# Patient Record
Sex: MALE | Race: WHITE | Employment: UNEMPLOYED | URBAN - METROPOLITAN AREA
[De-identification: names, ages, dates, MRNs, and addresses within clinical notes are randomized per-mention and may not be internally consistent; named-entity substitution may affect disease eponyms.]

---

## 2017-01-04 ENCOUNTER — OFFICE VISIT (OUTPATIENT)
Dept: PAIN CLINIC | Facility: HOSPITAL | Age: 47
End: 2017-01-04
Attending: ANESTHESIOLOGY

## 2017-01-04 VITALS
SYSTOLIC BLOOD PRESSURE: 116 MMHG | HEIGHT: 69 IN | WEIGHT: 150 LBS | BODY MASS INDEX: 22.22 KG/M2 | HEART RATE: 58 BPM | DIASTOLIC BLOOD PRESSURE: 72 MMHG

## 2017-01-04 DIAGNOSIS — K62.89 RECTAL PAIN: Primary | ICD-10-CM

## 2017-01-04 PROCEDURE — 99211 OFF/OP EST MAY X REQ PHY/QHP: CPT
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
## 2017-01-10 ENCOUNTER — TELEPHONE (OUTPATIENT)
Dept: PAIN CLINIC | Facility: HOSPITAL | Age: 47
End: 2017-01-10

## 2017-02-06 ENCOUNTER — OFFICE VISIT (OUTPATIENT)
Dept: PAIN CLINIC | Facility: HOSPITAL | Age: 47
End: 2017-02-06
Attending: ANESTHESIOLOGY
Payer: COMMERCIAL

## 2017-02-06 VITALS
BODY MASS INDEX: 22.96 KG/M2 | DIASTOLIC BLOOD PRESSURE: 70 MMHG | HEIGHT: 69 IN | HEART RATE: 58 BPM | WEIGHT: 155 LBS | SYSTOLIC BLOOD PRESSURE: 122 MMHG

## 2017-02-06 DIAGNOSIS — M53.3 COCCYDYNIA: Primary | ICD-10-CM

## 2017-02-06 PROCEDURE — 99211 OFF/OP EST MAY X REQ PHY/QHP: CPT
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
## 2017-02-14 ENCOUNTER — TELEPHONE (OUTPATIENT)
Dept: NEUROLOGY | Facility: CLINIC | Age: 47
End: 2017-02-14

## 2017-02-14 NOTE — TELEPHONE ENCOUNTER
Medication request: Celebrex 200mg     LOV 4/22/16  No follow up    Last refill: 10/12/16 #60    Patient has appt with pain management (Dr. Louie Pisano on 2/24/17) and has been seeing pain management for care    Reviewed with Dr. Derek Villegas who states refill reques

## 2017-02-22 RX ORDER — CELECOXIB 200 MG/1
CAPSULE ORAL
Qty: 60 CAPSULE | Refills: 0 | OUTPATIENT
Start: 2017-02-22

## 2017-03-20 ENCOUNTER — TELEPHONE (OUTPATIENT)
Dept: PAIN CLINIC | Facility: HOSPITAL | Age: 47
End: 2017-03-20

## 2017-03-20 ENCOUNTER — HOSPITAL ENCOUNTER (OUTPATIENT)
Dept: GENERAL RADIOLOGY | Facility: HOSPITAL | Age: 47
Discharge: HOME | End: 2017-03-20
Attending: ANESTHESIOLOGY
Payer: COMMERCIAL

## 2017-03-20 DIAGNOSIS — M53.3 COCCYDYNIA: ICD-10-CM

## 2017-03-20 PROCEDURE — 72220 X-RAY EXAM SACRUM TAILBONE: CPT

## 2017-03-20 NOTE — TELEPHONE ENCOUNTER
Patient walked in to make an appointment with Dr Naz Gomez only in March or April. I offered to schedule with a different physician. Patient declined and walked away. Patient walked away swearing.

## 2019-04-11 ENCOUNTER — OFFICE VISIT (OUTPATIENT)
Dept: INTERNAL MEDICINE CLINIC | Facility: CLINIC | Age: 49
End: 2019-04-11
Payer: COMMERCIAL

## 2019-04-11 VITALS
HEIGHT: 69 IN | HEART RATE: 47 BPM | RESPIRATION RATE: 16 BRPM | BODY MASS INDEX: 26.07 KG/M2 | DIASTOLIC BLOOD PRESSURE: 94 MMHG | SYSTOLIC BLOOD PRESSURE: 150 MMHG | WEIGHT: 176 LBS

## 2019-04-11 DIAGNOSIS — M25.50 ARTHRALGIA, UNSPECIFIED JOINT: ICD-10-CM

## 2019-04-11 DIAGNOSIS — R25.2 MUSCLE CRAMPS: ICD-10-CM

## 2019-04-11 DIAGNOSIS — R10.84 GENERALIZED ABDOMINAL PAIN: Primary | ICD-10-CM

## 2019-04-11 PROCEDURE — 99205 OFFICE O/P NEW HI 60 MIN: CPT | Performed by: INTERNAL MEDICINE

## 2019-04-11 PROCEDURE — 99212 OFFICE O/P EST SF 10 MIN: CPT | Performed by: INTERNAL MEDICINE

## 2019-04-11 RX ORDER — ACETAMINOPHEN 325 MG/1
325 TABLET ORAL EVERY 6 HOURS PRN
COMMUNITY
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
## 2019-04-11 NOTE — PATIENT INSTRUCTIONS
Problem List Items Addressed This Visit        Unprioritized    Generalized abdominal pain - Primary     6-year history of abdominal bloating, crampy pain, diarrhea with sense of incomplete evacuation and incontinence.   He has lost a significant amount of

## 2019-04-11 NOTE — ASSESSMENT & PLAN NOTE
6-year history of abdominal bloating, crampy pain, diarrhea with sense of incomplete evacuation and incontinence. He has lost a significant amount of weight but has managed to gain some by force-feeding himself with smoothies at this time.   No fevers or c

## 2019-04-24 ENCOUNTER — APPOINTMENT (OUTPATIENT)
Dept: LAB | Facility: HOSPITAL | Age: 49
End: 2019-04-24
Attending: INTERNAL MEDICINE
Payer: COMMERCIAL

## 2019-04-24 PROCEDURE — 36415 COLL VENOUS BLD VENIPUNCTURE: CPT | Performed by: INTERNAL MEDICINE

## 2019-04-24 PROCEDURE — 86039 ANTINUCLEAR ANTIBODIES (ANA): CPT | Performed by: INTERNAL MEDICINE

## 2019-04-24 PROCEDURE — 81003 URINALYSIS AUTO W/O SCOPE: CPT | Performed by: INTERNAL MEDICINE

## 2019-04-24 PROCEDURE — 85025 COMPLETE CBC W/AUTO DIFF WBC: CPT | Performed by: INTERNAL MEDICINE

## 2019-04-24 PROCEDURE — 85652 RBC SED RATE AUTOMATED: CPT | Performed by: INTERNAL MEDICINE

## 2019-04-24 PROCEDURE — 83516 IMMUNOASSAY NONANTIBODY: CPT | Performed by: INTERNAL MEDICINE

## 2019-04-24 PROCEDURE — 86003 ALLG SPEC IGE CRUDE XTRC EA: CPT | Performed by: INTERNAL MEDICINE

## 2019-04-24 PROCEDURE — 84443 ASSAY THYROID STIM HORMONE: CPT | Performed by: INTERNAL MEDICINE

## 2019-04-24 PROCEDURE — 86235 NUCLEAR ANTIGEN ANTIBODY: CPT | Performed by: INTERNAL MEDICINE

## 2019-04-24 PROCEDURE — 82306 VITAMIN D 25 HYDROXY: CPT | Performed by: INTERNAL MEDICINE

## 2019-04-24 PROCEDURE — 82607 VITAMIN B-12: CPT | Performed by: INTERNAL MEDICINE

## 2019-04-24 PROCEDURE — 82150 ASSAY OF AMYLASE: CPT | Performed by: INTERNAL MEDICINE

## 2019-04-24 PROCEDURE — 83735 ASSAY OF MAGNESIUM: CPT | Performed by: INTERNAL MEDICINE

## 2019-04-24 PROCEDURE — 86038 ANTINUCLEAR ANTIBODIES: CPT | Performed by: INTERNAL MEDICINE

## 2019-04-24 PROCEDURE — 84550 ASSAY OF BLOOD/URIC ACID: CPT | Performed by: INTERNAL MEDICINE

## 2019-04-24 PROCEDURE — 82785 ASSAY OF IGE: CPT | Performed by: INTERNAL MEDICINE

## 2019-04-24 PROCEDURE — 86225 DNA ANTIBODY NATIVE: CPT | Performed by: INTERNAL MEDICINE

## 2019-04-24 PROCEDURE — 86431 RHEUMATOID FACTOR QUANT: CPT | Performed by: INTERNAL MEDICINE

## 2019-04-24 PROCEDURE — 83690 ASSAY OF LIPASE: CPT | Performed by: INTERNAL MEDICINE

## 2019-04-24 PROCEDURE — 80053 COMPREHEN METABOLIC PANEL: CPT | Performed by: INTERNAL MEDICINE

## 2019-04-25 ENCOUNTER — HOSPITAL ENCOUNTER (OUTPATIENT)
Dept: CT IMAGING | Facility: HOSPITAL | Age: 49
Discharge: HOME | End: 2019-04-25
Attending: INTERNAL MEDICINE
Payer: COMMERCIAL

## 2019-04-25 DIAGNOSIS — R10.84 GENERALIZED ABDOMINAL PAIN: ICD-10-CM

## 2019-04-25 PROCEDURE — 74177 CT ABD & PELVIS W/CONTRAST: CPT | Performed by: INTERNAL MEDICINE

## 2019-05-03 ENCOUNTER — OFFICE VISIT (OUTPATIENT)
Dept: INTERNAL MEDICINE CLINIC | Facility: CLINIC | Age: 49
End: 2019-05-03
Payer: COMMERCIAL

## 2019-05-03 VITALS
SYSTOLIC BLOOD PRESSURE: 122 MMHG | HEIGHT: 69 IN | WEIGHT: 178 LBS | DIASTOLIC BLOOD PRESSURE: 77 MMHG | BODY MASS INDEX: 26.36 KG/M2 | HEART RATE: 52 BPM

## 2019-05-03 DIAGNOSIS — R25.2 MUSCLE CRAMPS: ICD-10-CM

## 2019-05-03 DIAGNOSIS — R10.84 GENERALIZED ABDOMINAL PAIN: ICD-10-CM

## 2019-05-03 DIAGNOSIS — K58.0 IRRITABLE BOWEL SYNDROME WITH DIARRHEA: Primary | ICD-10-CM

## 2019-05-03 PROCEDURE — 99212 OFFICE O/P EST SF 10 MIN: CPT | Performed by: INTERNAL MEDICINE

## 2019-05-03 PROCEDURE — 99214 OFFICE O/P EST MOD 30 MIN: CPT | Performed by: INTERNAL MEDICINE

## 2019-05-03 RX ORDER — TIZANIDINE 2 MG/1
2 TABLET ORAL NIGHTLY
Qty: 30 TABLET | Refills: 2 | Status: SHIPPED | OUTPATIENT
Start: 2019-05-03 | End: 2019-07-30

## 2019-05-03 RX ORDER — DICYCLOMINE HYDROCHLORIDE 10 MG/1
CAPSULE ORAL
Qty: 30 CAPSULE | Refills: 0 | Status: SHIPPED | OUTPATIENT
Start: 2019-05-03 | End: 2019-06-03

## 2019-05-03 NOTE — PROGRESS NOTES
HPI:    Patient ID: Prudencio Dougherty is a 52year old male. CT abdomen as follows. FINDINGS:  LUNG BASES:  The heart is normal in size. There is dependent subsegmental atelectasis bilaterally.     LIVER:  Isolated low density adjacent to the falciform l started more than 1 year ago. The onset quality is gradual. The problem occurs constantly. The problem has been unchanged. The pain is located in the generalized abdominal region. The pain is at a severity of 9/10. The pain is severe.  The quality of the pa Carbonate Antacid (TUMS E-X 750 OR) Take by mouth. Disp:  Rfl:    Pantoprazole Sodium (PROTONIX) 40 MG Oral Tab EC  Disp:  Rfl: 6   Multiple Vitamin (ONE-DAILY MULTI VITAMINS) Oral Tab Take 1 tablet by mouth daily.  Disp:  Rfl:      Allergies:  Shrimp Medications    Dicyclomine HCl 10 MG Oral Cap    Other Relevant Orders    PELVIC FLOOR THERAPY - INTERNAL    Generalized abdominal pain     6-year history of abdominal pain–crampy associated with recurrent bowel movements usually in the morning which seems 5/31/2019), or if symptoms worsen or fail to improve. PT UNDERSTANDS AND AGREES TO FOLLOW DIRECTIONS AND ADVICE    No orders of the defined types were placed in this encounter.       Meds This Visit:  Requested Prescriptions     Signed Prescriptions Disp

## 2019-05-03 NOTE — PATIENT INSTRUCTIONS
Problem List Items Addressed This Visit        Unprioritized    Generalized abdominal pain    Relevant Medications    Dicyclomine HCl 10 MG Oral Cap    Other Relevant Orders    PELVIC FLOOR THERAPY - INTERNAL    IBS (irritable bowel syndrome) - Primary

## 2019-05-13 NOTE — ASSESSMENT & PLAN NOTE
6-year history of abdominal pain–crampy associated with recurrent bowel movements usually in the morning which seems almost like incomplete evacuation. He has about 6-10 formed bowel movements associated with significant cramps and pains.   He did lose a s
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
## 2019-06-03 DIAGNOSIS — R10.84 GENERALIZED ABDOMINAL PAIN: ICD-10-CM

## 2019-06-03 DIAGNOSIS — K58.0 IRRITABLE BOWEL SYNDROME WITH DIARRHEA: ICD-10-CM

## 2019-06-03 RX ORDER — DICYCLOMINE HYDROCHLORIDE 10 MG/1
CAPSULE ORAL
Qty: 30 CAPSULE | Refills: 0 | Status: SHIPPED | OUTPATIENT
Start: 2019-06-03 | End: 2019-08-20 | Stop reason: ALTCHOICE

## 2019-06-03 NOTE — TELEPHONE ENCOUNTER
Current Outpatient Medications:  Dicyclomine HCl 10 MG Oral Cap 1 capsule once  Day in am Disp: 30 capsule Rfl: 0

## 2019-07-29 DIAGNOSIS — R25.2 MUSCLE CRAMPS: ICD-10-CM

## 2019-07-30 RX ORDER — TIZANIDINE 2 MG/1
2 TABLET ORAL NIGHTLY
Qty: 30 TABLET | Refills: 2 | Status: SHIPPED | OUTPATIENT
Start: 2019-07-30 | End: 2022-01-20 | Stop reason: ALTCHOICE

## 2019-07-30 NOTE — TELEPHONE ENCOUNTER
Review pended refill request as it does not fall under a protocol.     Last Rx: 5/3/19  LOV: 5/3/19    Requested Prescriptions   Pending Prescriptions Disp Refills   • tiZANidine HCl 2 MG Oral Tab 30 tablet 2     Sig: Take 1 tablet (2 mg total) by mouth nig

## 2019-08-20 ENCOUNTER — OFFICE VISIT (OUTPATIENT)
Dept: INTERNAL MEDICINE CLINIC | Facility: CLINIC | Age: 49
End: 2019-08-20
Payer: COMMERCIAL

## 2019-08-20 VITALS
DIASTOLIC BLOOD PRESSURE: 89 MMHG | WEIGHT: 171.69 LBS | HEIGHT: 69 IN | HEART RATE: 57 BPM | BODY MASS INDEX: 25.43 KG/M2 | SYSTOLIC BLOOD PRESSURE: 144 MMHG

## 2019-08-20 DIAGNOSIS — R10.2 CHRONIC PELVIC PAIN IN MALE: Primary | ICD-10-CM

## 2019-08-20 DIAGNOSIS — G89.29 CHRONIC PELVIC PAIN IN MALE: Primary | ICD-10-CM

## 2019-08-20 PROCEDURE — 99213 OFFICE O/P EST LOW 20 MIN: CPT | Performed by: INTERNAL MEDICINE
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
## 2022-01-20 PROBLEM — F41.9 ANXIETY: Status: ACTIVE | Noted: 2022-01-20

## 2022-01-20 PROBLEM — F32.A DEPRESSION: Status: ACTIVE | Noted: 2022-01-20

## 2022-01-20 PROBLEM — G89.29 CHRONIC BILATERAL LOW BACK PAIN WITHOUT SCIATICA: Status: ACTIVE | Noted: 2018-02-01

## 2022-01-20 PROBLEM — M54.50 CHRONIC BILATERAL LOW BACK PAIN WITHOUT SCIATICA: Status: ACTIVE | Noted: 2018-02-01

## 2022-01-20 PROBLEM — G89.29 CHRONIC MIDLINE LOW BACK PAIN WITH LEFT-SIDED SCIATICA: Status: ACTIVE | Noted: 2018-02-01

## 2022-01-20 PROBLEM — F51.02 INSOMNIA DUE TO STRESS: Status: ACTIVE | Noted: 2018-02-01

## 2022-01-20 PROBLEM — K21.9 GERD WITHOUT ESOPHAGITIS: Status: ACTIVE | Noted: 2022-01-20

## 2022-01-20 PROBLEM — R20.0 NUMBNESS AND TINGLING OF LEFT LEG: Status: ACTIVE | Noted: 2022-01-20

## 2022-01-20 PROBLEM — R20.2 NUMBNESS AND TINGLING OF LEFT LEG: Status: ACTIVE | Noted: 2022-01-20

## 2022-01-20 PROBLEM — M54.42 CHRONIC MIDLINE LOW BACK PAIN WITH LEFT-SIDED SCIATICA: Status: ACTIVE | Noted: 2018-02-01

## 2022-02-01 ENCOUNTER — OFFICE VISIT (OUTPATIENT)
Dept: PHYSICAL MEDICINE AND REHAB | Facility: CLINIC | Age: 52
End: 2022-02-01
Payer: COMMERCIAL

## 2022-02-01 DIAGNOSIS — K58.0 IRRITABLE BOWEL SYNDROME WITH DIARRHEA: ICD-10-CM

## 2022-02-01 DIAGNOSIS — M48.061 LUMBAR FORAMINAL STENOSIS: ICD-10-CM

## 2022-02-01 DIAGNOSIS — M51.9 LUMBAR DISC DISEASE: Primary | ICD-10-CM

## 2022-02-01 DIAGNOSIS — F41.9 ANXIETY: ICD-10-CM

## 2022-02-01 DIAGNOSIS — M54.16 LUMBAR RADICULOPATHY: ICD-10-CM

## 2022-02-01 DIAGNOSIS — K21.9 GERD WITHOUT ESOPHAGITIS: ICD-10-CM

## 2022-02-01 DIAGNOSIS — S32.030D COMPRESSION FRACTURE OF L3 VERTEBRA WITH ROUTINE HEALING, SUBSEQUENT ENCOUNTER: ICD-10-CM

## 2022-02-01 PROCEDURE — 99244 OFF/OP CNSLTJ NEW/EST MOD 40: CPT | Performed by: PHYSICAL MEDICINE & REHABILITATION

## 2022-02-01 RX ORDER — GABAPENTIN 100 MG/1
100 CAPSULE ORAL 3 TIMES DAILY
Qty: 90 CAPSULE | Refills: 5 | Status: SHIPPED | OUTPATIENT
Start: 2022-02-01 | End: 2023-02-01

## 2022-02-01 NOTE — PATIENT INSTRUCTIONS
Plan  He will get the MRI scan and x-rays as ordered and he will get copies of these to me so that I can review them. Depending on what these show, he might benefit form lumbar PROSPER's. He will try Neurontin 100 mg 3 times a day. He will let me know in one week how this working for him.

## 2022-02-10 ENCOUNTER — TELEPHONE (OUTPATIENT)
Dept: PHYSICAL MEDICINE AND REHAB | Facility: CLINIC | Age: 52
End: 2022-02-10

## 2022-02-15 PROBLEM — M48.061 SPINAL STENOSIS OF LUMBAR REGION WITHOUT NEUROGENIC CLAUDICATION: Status: ACTIVE | Noted: 2022-02-15

## 2022-02-16 NOTE — TELEPHONE ENCOUNTER
Spoke with patient who stated he is doing about the same. States the lower dose of Gabapentin is helping his joints feel looser and he is able to move better, but his pain is still the same. Pain 7-8/10. Also stated he has been taking Gabapentin 1 capsule in the am and 1 capsule in the evening. States he does get GI issues from the Gabapentin and has to stop it for a few days, but thinks it is still helping. Patient would like to discuss injection in detail with Kirti Crespo prior to scheduling. Patient also is going to be completing the lumbar xray sometime next week. Message relayed to Tabatha Zak. Awaiting recommendations.

## 2022-02-16 NOTE — TELEPHONE ENCOUNTER
I reviewed his MRI scan and his bulging discs and stenosis have worsened. Please see how he is doing with the Neurontin. If he is still having significant pain, then he will need to have bilateral L5 TFESI's.

## 2022-02-16 NOTE — TELEPHONE ENCOUNTER
Per Bakari mills for either in office appointment or video visit after patient completes his xray. Xray not yet scheduled. Message forwarded to from office to schedule f/u appointment (in office or video visit) with patient after xray is scheduled.

## 2022-03-23 ENCOUNTER — PATIENT MESSAGE (OUTPATIENT)
Dept: PHYSICAL MEDICINE AND REHAB | Facility: CLINIC | Age: 52
End: 2022-03-23

## 2022-03-23 NOTE — TELEPHONE ENCOUNTER
Per Dr Addy Gibbs on 2/15/22 \"I reviewed his MRI scan and his bulging discs and stenosis have worsened. Please see how he is doing with the Neurontin. If he is still having significant pain, then he will need to have bilateral L5 TFESI's. \"

## 2022-03-23 NOTE — TELEPHONE ENCOUNTER
From: Esther Sal  To: Ulysses Ina A. Glyn Bloom, MD  Sent: 3/23/2022 9:44 AM CDT  Subject: no appointments soon help    Looks like no appointments for 2 months? I will need to schedule the injections sooner than that's. Please contact me and let me know my options. Really suffering daily and medication no longer helps?      Thanks for your help    Francisca Pugh

## 2022-03-24 ENCOUNTER — HOSPITAL ENCOUNTER (OUTPATIENT)
Dept: GENERAL RADIOLOGY | Age: 52
Discharge: HOME | End: 2022-03-24
Attending: PHYSICAL MEDICINE & REHABILITATION
Payer: COMMERCIAL

## 2022-03-24 DIAGNOSIS — M51.9 LUMBAR DISC DISEASE: ICD-10-CM

## 2022-03-24 DIAGNOSIS — S32.030D COMPRESSION FRACTURE OF L3 VERTEBRA WITH ROUTINE HEALING, SUBSEQUENT ENCOUNTER: ICD-10-CM

## 2022-03-24 DIAGNOSIS — M54.16 LUMBAR RADICULOPATHY: ICD-10-CM

## 2022-03-24 DIAGNOSIS — M48.061 LUMBAR FORAMINAL STENOSIS: ICD-10-CM

## 2022-03-24 PROCEDURE — 72110 X-RAY EXAM L-2 SPINE 4/>VWS: CPT | Performed by: PHYSICAL MEDICINE & REHABILITATION

## 2022-03-25 ENCOUNTER — TELEPHONE (OUTPATIENT)
Dept: NEUROLOGY | Facility: CLINIC | Age: 52
End: 2022-03-25

## 2022-03-25 NOTE — TELEPHONE ENCOUNTER
Patient wants to discuss with Dr Neto Marshall after having XR-L and Colonoscopy on 4/5/22.  T.E 3/24/22

## 2022-03-25 NOTE — TELEPHONE ENCOUNTER
[employer] for authorization of approval for Bilateral L5 transforaminal epidural steroid injections cpt codes [id number], [id number]. Authorization is not required. Transaction ID: [id number]. Will inform Nursing.

## 2022-04-05 ENCOUNTER — ANESTHESIA EVENT (OUTPATIENT)
Dept: GASTROENTEROLOGY | Age: 52
End: 2022-04-05

## 2022-04-05 ENCOUNTER — ANESTHESIA (OUTPATIENT)
Dept: GASTROENTEROLOGY | Age: 52
End: 2022-04-05

## 2022-04-05 ENCOUNTER — HOSPITAL ENCOUNTER (OUTPATIENT)
Dept: GENERAL RADIOLOGY | Age: 52
Discharge: HOME | End: 2022-04-05
Attending: SPECIALIST

## 2022-04-05 ENCOUNTER — HOSPITAL ENCOUNTER (OUTPATIENT)
Dept: GASTROENTEROLOGY | Age: 52
Discharge: HOME | End: 2022-04-05
Attending: SPECIALIST

## 2022-04-05 VITALS
SYSTOLIC BLOOD PRESSURE: 125 MMHG | WEIGHT: 175 LBS | OXYGEN SATURATION: 98 % | HEIGHT: 69 IN | TEMPERATURE: 96.8 F | DIASTOLIC BLOOD PRESSURE: 89 MMHG | HEART RATE: 73 BPM | RESPIRATION RATE: 18 BRPM | BODY MASS INDEX: 25.92 KG/M2

## 2022-04-05 DIAGNOSIS — R19.4 CHANGE IN BOWEL HABIT: ICD-10-CM

## 2022-04-05 DIAGNOSIS — K59.00 CONSTIPATION, UNSPECIFIED CONSTIPATION TYPE: ICD-10-CM

## 2022-04-05 PROCEDURE — 10002807 HB RX 258: Performed by: ANESTHESIOLOGY

## 2022-04-05 PROCEDURE — 13000004 HB  ANESTHESIA  GENERAL OUTSIDE OR

## 2022-04-05 PROCEDURE — 13000024 HB GI COMPLEX CASE S/U + 1ST 15 MIN

## 2022-04-05 PROCEDURE — 10002800 HB RX 250 W HCPCS: Performed by: ANESTHESIOLOGY

## 2022-04-05 PROCEDURE — 74270 X-RAY XM COLON 1CNTRST STD: CPT

## 2022-04-05 PROCEDURE — 10002801 HB RX 250 W/O HCPCS: Performed by: ANESTHESIOLOGY

## 2022-04-05 PROCEDURE — 13000001 HB PHASE II RECOVERY EA 30 MINUTES

## 2022-04-05 PROCEDURE — 10002805 HB CONTRAST AGENT: Performed by: SPECIALIST

## 2022-04-05 RX ORDER — LIDOCAINE HYDROCHLORIDE 10 MG/ML
INJECTION, SOLUTION INFILTRATION; PERINEURAL PRN
Status: DISCONTINUED | OUTPATIENT
Start: 2022-04-05 | End: 2022-04-05

## 2022-04-05 RX ORDER — ONDANSETRON 2 MG/ML
4 INJECTION INTRAMUSCULAR; INTRAVENOUS
Status: DISCONTINUED | OUTPATIENT
Start: 2022-04-05 | End: 2022-04-07 | Stop reason: HOSPADM

## 2022-04-05 RX ORDER — PROPOFOL 10 MG/ML
INJECTION, EMULSION INTRAVENOUS PRN
Status: DISCONTINUED | OUTPATIENT
Start: 2022-04-05 | End: 2022-04-05

## 2022-04-05 RX ORDER — PANTOPRAZOLE SODIUM 40 MG/1
1 TABLET, DELAYED RELEASE ORAL DAILY
COMMUNITY
Start: 2022-01-03

## 2022-04-05 RX ORDER — NALOXONE HCL 0.4 MG/ML
0.2 VIAL (ML) INJECTION EVERY 5 MIN PRN
Status: DISCONTINUED | OUTPATIENT
Start: 2022-04-05 | End: 2022-04-07 | Stop reason: HOSPADM

## 2022-04-05 RX ORDER — GABAPENTIN 100 MG/1
100 CAPSULE ORAL DAILY
COMMUNITY
Start: 2022-03-28

## 2022-04-05 RX ORDER — SODIUM CHLORIDE, SODIUM LACTATE, POTASSIUM CHLORIDE, CALCIUM CHLORIDE 600; 310; 30; 20 MG/100ML; MG/100ML; MG/100ML; MG/100ML
INJECTION, SOLUTION INTRAVENOUS
Status: COMPLETED
Start: 2022-04-05 | End: 2022-04-05

## 2022-04-05 RX ORDER — SODIUM CHLORIDE, SODIUM LACTATE, POTASSIUM CHLORIDE, CALCIUM CHLORIDE 600; 310; 30; 20 MG/100ML; MG/100ML; MG/100ML; MG/100ML
INJECTION, SOLUTION INTRAVENOUS CONTINUOUS PRN
Status: DISCONTINUED | OUTPATIENT
Start: 2022-04-05 | End: 2022-04-05

## 2022-04-05 RX ORDER — SODIUM CHLORIDE, SODIUM LACTATE, POTASSIUM CHLORIDE, CALCIUM CHLORIDE 600; 310; 30; 20 MG/100ML; MG/100ML; MG/100ML; MG/100ML
INJECTION, SOLUTION INTRAVENOUS CONTINUOUS
Status: DISCONTINUED | OUTPATIENT
Start: 2022-04-05 | End: 2022-04-07 | Stop reason: HOSPADM

## 2022-04-05 RX ADMIN — BARIUM SULFATE 355 ML: 0.6 SUSPENSION ORAL at 11:10

## 2022-04-05 RX ADMIN — LIDOCAINE HYDROCHLORIDE 50 MG: 10 INJECTION, SOLUTION INFILTRATION; PERINEURAL at 09:26

## 2022-04-05 RX ADMIN — SODIUM CHLORIDE, POTASSIUM CHLORIDE, SODIUM LACTATE AND CALCIUM CHLORIDE: 600; 310; 30; 20 INJECTION, SOLUTION INTRAVENOUS at 09:25

## 2022-04-05 RX ADMIN — PROPOFOL 300 MCG/KG/MIN: 10 INJECTION, EMULSION INTRAVENOUS at 09:26

## 2022-04-05 RX ADMIN — PROPOFOL 100 MG: 10 INJECTION, EMULSION INTRAVENOUS at 09:28

## 2022-04-05 RX ADMIN — PROPOFOL 100 MG: 10 INJECTION, EMULSION INTRAVENOUS at 09:26

## 2022-04-05 ASSESSMENT — ACTIVITIES OF DAILY LIVING (ADL)
HISTORY OF FALLING IN THE LAST YEAR (PRIOR TO ADMISSION): NO
ADL_SCORE: 12
ADL_BEFORE_ADMISSION: INDEPENDENT

## 2022-04-05 ASSESSMENT — PAIN SCALES - GENERAL
PAINLEVEL_OUTOF10: 0
PAINLEVEL_OUTOF10: 0

## 2022-04-05 ASSESSMENT — ENCOUNTER SYMPTOMS: EXERCISE TOLERANCE: GOOD (>4 METS)

## 2022-05-05 ENCOUNTER — PATIENT MESSAGE (OUTPATIENT)
Dept: PHYSICAL MEDICINE AND REHAB | Facility: CLINIC | Age: 52
End: 2022-05-05

## 2022-05-05 NOTE — TELEPHONE ENCOUNTER
Dr. Mary Anne Munguia notified of pt's [employer] message. Will discuss PT and injection at [facility]. Would like to know where pt plans to do PT at.

## 2022-05-05 NOTE — TELEPHONE ENCOUNTER
From: Alda Costa  To: Kelsey [facility] A. Naoma Dance, MD  Sent: 5/5/2022 9:53 AM CDT  Subject: apt [place] 5/25, getting back    Hello, so sorry I haven't gotten back to you about the video message in April. The colonoscopy did not find any problems witch is good and I saw my G. I. Dr Brunilda Denis, recently and trying a few new medication that's has helped. I have a apt in [place] on 5/25 and well discuss with the Dr. Naoma Dance schedule and were to put the cortison injection. I am doing bit better with the warm weather but lower back pain, hip, leg, difficulty going to the bathroom are challenging all day. I well discuss with the  the possibility of doing a round a P.T. first, before injection. and hope to have a better understanding of were to place the injection (s ?) . Sorry I didn't return your message. Look forward to discuss P.T. and injection with the DrDavid on 5/25. It really seems there is a problem with the location of my leg bones in the sockets. jammed in too tight. With the bad disc of my lower back holding them locked in place, and unable to open hips. Thanks again.   Sincerely   [place]

## 2022-05-25 ENCOUNTER — OFFICE VISIT (OUTPATIENT)
Dept: PHYSICAL MEDICINE AND REHAB | Facility: CLINIC | Age: 52
End: 2022-05-25
Payer: COMMERCIAL

## 2022-05-25 VITALS
HEIGHT: 69 IN | BODY MASS INDEX: 23.85 KG/M2 | OXYGEN SATURATION: 98 % | SYSTOLIC BLOOD PRESSURE: 116 MMHG | WEIGHT: 161 LBS | DIASTOLIC BLOOD PRESSURE: 70 MMHG | HEART RATE: 76 BPM | RESPIRATION RATE: 16 BRPM

## 2022-05-25 DIAGNOSIS — M51.9 LUMBAR DISC DISEASE: ICD-10-CM

## 2022-05-25 DIAGNOSIS — S32.030D COMPRESSION FRACTURE OF L3 VERTEBRA WITH ROUTINE HEALING, SUBSEQUENT ENCOUNTER: ICD-10-CM

## 2022-05-25 DIAGNOSIS — M54.16 LUMBAR RADICULOPATHY: Primary | ICD-10-CM

## 2022-05-25 DIAGNOSIS — M48.061 LUMBAR FORAMINAL STENOSIS: ICD-10-CM

## 2022-05-25 DIAGNOSIS — M48.061 SPINAL STENOSIS OF LUMBAR REGION WITHOUT NEUROGENIC CLAUDICATION: ICD-10-CM

## 2022-05-25 PROCEDURE — 3008F BODY MASS INDEX DOCD: CPT | Performed by: PHYSICAL MEDICINE & REHABILITATION

## 2022-05-25 PROCEDURE — 3074F SYST BP LT 130 MM HG: CPT | Performed by: PHYSICAL MEDICINE & REHABILITATION

## 2022-05-25 PROCEDURE — 99214 OFFICE O/P EST MOD 30 MIN: CPT | Performed by: PHYSICAL MEDICINE & REHABILITATION

## 2022-05-25 PROCEDURE — 3078F DIAST BP <80 MM HG: CPT | Performed by: PHYSICAL MEDICINE & REHABILITATION

## 2022-05-25 NOTE — PATIENT INSTRUCTIONS
Plan  He will get into the PT with either Romy Negrete or [place] for the PT. I will hold on the bilateral L5 TFESI's for now. He will continue with the gabapentin as needed for the pain. He will follow up in 2-3 months or sooner if needed.

## 2022-06-21 ENCOUNTER — PATIENT MESSAGE (OUTPATIENT)
Dept: PHYSICAL MEDICINE AND REHAB | Facility: CLINIC | Age: 52
End: 2022-06-21

## 2022-06-22 ENCOUNTER — PATIENT MESSAGE (OUTPATIENT)
Dept: PHYSICAL MEDICINE AND REHAB | Facility: CLINIC | Age: 52
End: 2022-06-22

## 2022-06-22 RX ORDER — GABAPENTIN 100 MG/1
100 CAPSULE ORAL 3 TIMES DAILY
Qty: 90 CAPSULE | Refills: 5 | Status: SHIPPED | OUTPATIENT
Start: 2022-06-22 | End: 2023-06-22

## 2022-06-22 NOTE — TELEPHONE ENCOUNTER
From: Jason Singh  Sent: 6/22/2022 10:32 AM CDT  To: [address] Nurse  Subject: PT Scheduling and prescription renewal    Ok sounds good. Thank you for the help is there a certain person the Dr recommended. Ill ck my notes. Ill get scheduling today.     Thank You  Whitney Mclean

## 2022-06-22 NOTE — TELEPHONE ENCOUNTER
From: Jason Singh  To: Lucina Kong MD  Sent: 6/21/2022 1:35 PM CDT  Subject: PT Scheduling and prescription renewal    Hello,   I am so sorry I am just setting up my PT at [facility]. I am not sure if I am just to give them a call or how to start the scheduling. I am hoping to start at two days a week and then one day hoping this will give time for progress. Please point me in the right direction to start? I had a family matter regarding my Mother that delayed my scheduling. My prescription for gabapentin is due for renewal? If possible. It be helpful to continue, although I can not take it all the time it still seems to allow me to reduce Advil still and lower inflammation but like everything is hard on my stomach. I like to see if more exercise helps. Thank you  Fransisca Mascorro.  Jon Chandra

## 2022-06-22 NOTE — TELEPHONE ENCOUNTER
From: Taylor Adames  To: Fatemeh Page MD  Sent: 6/21/2022 1:49 PM CDT  Subject: Recommended name for PT     The  recommended a few name that are best for PT?     Thank You, again     Colletta Bras

## 2022-06-22 NOTE — TELEPHONE ENCOUNTER
Refill Request    Medication request: gabapentin 100 MG Oral Cap  Take 1 capsule (100 mg total) by mouth 3 (three) times daily. Jessica Santa MD   Due back to clinic per last office note:  \"follow up in 2-3 months\"  NOV: Visit date not found      [place]: 5[place]19/22 #90    Urine drug screen (if applicable): n/a  Pain contract: none    LOV plan (if weaning or changing medications): Per  at [address] [place] [address]: \"He will continue with the gabapentin as needed for the pain. \"

## 2022-07-11 RX ORDER — GABAPENTIN 100 MG/1
CAPSULE ORAL
Qty: 90 CAPSULE | Refills: 5 | OUTPATIENT
Start: 2022-07-11

## 2022-07-11 NOTE — TELEPHONE ENCOUNTER
*Refill Denied. Last refill 06/22/22 had 5 refills. Spoke with pharmacy and they confirmed he has refills. Medication request: gabapentin 100 MG Oral Cap  Take 1 capsule (100 mg total) by mouth 3 (three) times daily.     David Durbin MD   Due back to clinic per last office note:  2-3 months  NOV: Visit date not found      [place] 06/22/22 #90 5 refills

## 2022-07-13 ENCOUNTER — TELEPHONE (OUTPATIENT)
Dept: PHYSICAL THERAPY | Facility: HOSPITAL | Age: 52
End: 2022-07-13

## 2022-07-13 ENCOUNTER — TELEPHONE (OUTPATIENT)
Dept: PHYSICAL MEDICINE AND REHAB | Facility: CLINIC | Age: 52
End: 2022-07-13

## 2022-08-18 ENCOUNTER — TELEPHONE (OUTPATIENT)
Dept: PHYSICAL THERAPY | Facility: HOSPITAL | Age: 52
End: 2022-08-18

## 2022-08-22 ENCOUNTER — OFFICE VISIT (OUTPATIENT)
Dept: PHYSICAL THERAPY | Facility: HOSPITAL | Age: 52
End: 2022-08-22
Attending: PHYSICAL MEDICINE & REHABILITATION
Payer: COMMERCIAL

## 2022-08-22 DIAGNOSIS — S32.030D COMPRESSION FRACTURE OF L3 VERTEBRA WITH ROUTINE HEALING, SUBSEQUENT ENCOUNTER: ICD-10-CM

## 2022-08-22 DIAGNOSIS — M51.9 LUMBAR DISC DISEASE: ICD-10-CM

## 2022-08-22 DIAGNOSIS — M48.061 LUMBAR FORAMINAL STENOSIS: ICD-10-CM

## 2022-08-22 DIAGNOSIS — M54.16 LUMBAR RADICULOPATHY: ICD-10-CM

## 2022-08-22 DIAGNOSIS — M48.061 SPINAL STENOSIS OF LUMBAR REGION WITHOUT NEUROGENIC CLAUDICATION: ICD-10-CM

## 2022-08-22 PROCEDURE — 97162 PT EVAL MOD COMPLEX 30 MIN: CPT

## 2022-08-22 PROCEDURE — 97110 THERAPEUTIC EXERCISES: CPT

## 2022-08-29 ENCOUNTER — APPOINTMENT (OUTPATIENT)
Dept: PHYSICAL THERAPY | Facility: HOSPITAL | Age: 52
End: 2022-08-29
Attending: PHYSICAL MEDICINE & REHABILITATION
Payer: COMMERCIAL

## 2022-09-08 ENCOUNTER — OFFICE VISIT (OUTPATIENT)
Dept: PHYSICAL THERAPY | Facility: HOSPITAL | Age: 52
End: 2022-09-08
Attending: PHYSICAL MEDICINE & REHABILITATION
Payer: COMMERCIAL

## 2022-09-08 PROCEDURE — 97140 MANUAL THERAPY 1/> REGIONS: CPT

## 2022-09-08 PROCEDURE — 97110 THERAPEUTIC EXERCISES: CPT

## 2022-09-08 PROCEDURE — 97112 NEUROMUSCULAR REEDUCATION: CPT

## 2022-09-29 ENCOUNTER — TELEPHONE (OUTPATIENT)
Dept: PHYSICAL THERAPY | Facility: HOSPITAL | Age: 52
End: 2022-09-29

## 2022-10-04 ENCOUNTER — PATIENT MESSAGE (OUTPATIENT)
Dept: PHYSICAL MEDICINE AND REHAB | Facility: CLINIC | Age: 52
End: 2022-10-04

## 2022-10-05 NOTE — TELEPHONE ENCOUNTER
Pt reports feeling a cyst in R buttock, painful to the touch  -5 cm diameter and moves around.   -Notes inflammation and acute pain after sitting, bending and after bowel movements, unable to sleep. -Reports pain from shoulders to feet, he believes the cyst is causing it.  -\"Knot\" in lower back/SI joints, hips \"lock\". -Advised patient to reach out to his Primary Care Doctor or head out to the ER to have cyst checked out since it's causing him a lot of pain. Will route message to Dr Aris Walsh in case he has other recommendations.
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
## 2022-10-06 ENCOUNTER — APPOINTMENT (OUTPATIENT)
Dept: PHYSICAL THERAPY | Facility: HOSPITAL | Age: 52
End: 2022-10-06
Attending: PHYSICAL MEDICINE & REHABILITATION
Payer: COMMERCIAL

## 2022-10-13 ENCOUNTER — APPOINTMENT (OUTPATIENT)
Dept: PHYSICAL THERAPY | Facility: HOSPITAL | Age: 52
End: 2022-10-13
Attending: PHYSICAL MEDICINE & REHABILITATION
Payer: COMMERCIAL

## 2023-03-17 ENCOUNTER — LAB REQUISITION (OUTPATIENT)
Dept: SURGERY | Age: 53
End: 2023-03-17
Payer: COMMERCIAL

## 2023-03-17 DIAGNOSIS — L02.33 CARBUNCLE OF BUTTOCK: ICD-10-CM

## 2023-03-17 PROCEDURE — 88304 TISSUE EXAM BY PATHOLOGIST: CPT | Performed by: SURGERY

## 2023-03-17 PROCEDURE — 88311 DECALCIFY TISSUE: CPT | Performed by: SURGERY

## 2023-03-17 PROCEDURE — 88307 TISSUE EXAM BY PATHOLOGIST: CPT | Performed by: SURGERY

## 2023-03-17 PROCEDURE — 88305 TISSUE EXAM BY PATHOLOGIST: CPT | Performed by: SURGERY

## 2025-04-21 ENCOUNTER — TELEPHONE (OUTPATIENT)
Dept: SURGERY | Facility: CLINIC | Age: 55
End: 2025-04-21

## 2025-04-21 ENCOUNTER — HOSPITAL ENCOUNTER (OUTPATIENT)
Age: 55
Discharge: HOME | End: 2025-04-21
Attending: STUDENT IN AN ORGANIZED HEALTH CARE EDUCATION/TRAINING PROGRAM
Payer: COMMERCIAL

## 2025-04-21 ENCOUNTER — APPOINTMENT (OUTPATIENT)
Dept: ULTRASOUND IMAGING | Age: 55
End: 2025-04-21
Attending: STUDENT IN AN ORGANIZED HEALTH CARE EDUCATION/TRAINING PROGRAM
Payer: COMMERCIAL

## 2025-04-21 VITALS
HEART RATE: 57 BPM | OXYGEN SATURATION: 97 % | TEMPERATURE: 99 F | SYSTOLIC BLOOD PRESSURE: 130 MMHG | DIASTOLIC BLOOD PRESSURE: 76 MMHG | RESPIRATION RATE: 16 BRPM

## 2025-04-21 DIAGNOSIS — N50.89 MASS OF LEFT TESTICLE: Primary | ICD-10-CM

## 2025-04-21 PROCEDURE — 76870 US EXAM SCROTUM: CPT | Performed by: STUDENT IN AN ORGANIZED HEALTH CARE EDUCATION/TRAINING PROGRAM

## 2025-04-21 PROCEDURE — 99203 OFFICE O/P NEW LOW 30 MIN: CPT

## 2025-04-21 PROCEDURE — 99214 OFFICE O/P EST MOD 30 MIN: CPT

## 2025-04-21 PROCEDURE — 93975 VASCULAR STUDY: CPT | Performed by: STUDENT IN AN ORGANIZED HEALTH CARE EDUCATION/TRAINING PROGRAM

## 2025-04-21 NOTE — TELEPHONE ENCOUNTER
Can we please contact patient to offer him either an appointment with Pj or me tomorrow or one of the attendings more urgently for discussion of management of L testicular mass concerning for primarily testicular neoplasm?    Thank you!

## 2025-04-21 NOTE — DISCHARGE INSTRUCTIONS
The testicular ultrasound is concerning for a mass of the left testicle in appearance that is most concerning for cancer of the testicle per the radiology report.  Therefore, I recommend follow-up with urology in the next 3 days for reassessment and for further recommendations.      [email or web address]    Specialty: Urologic Oncology

## 2025-04-21 NOTE — ED PROVIDER NOTES
Patient Seen in: [facility]      History     Chief Complaint   Patient presents with    Eval-G     Stated Complaint: Eval-G, swelling and discomfort    Subjective:   HPI    55-year-old male with no significant past medical history, who presents with concern for atraumatic left testicular swelling for 2 to 3 weeks, pain has increased symptoms over the last 4 to 5 days, notes 2 lumps in the regions.  He denies any dysuria or hematuria.        Objective:     Past Medical History:    Anal fissure    Bilateral presbyopia    Choroid scar- right eye superotemp    Colon polyp    Esophageal reflux    Floaters    IBS (irritable bowel syndrome)    Subjective visual disturbance of both eyes              Past Surgical History:   Procedure Laterality Date    Ankle fracture surgery      left ankle    Colonoscopy  2014    Ip consult to colorectal surgery      rectal surgery                Social History     Socioeconomic History    Marital status: Single   Tobacco Use    Smoking status: Never    Smokeless tobacco: Never   Vaping Use    Vaping status: Never Used   Substance and Sexual Activity    Alcohol use: No     Alcohol/week: 0.0 standard drinks of alcohol     Comment: none     Drug use: Yes   Other Topics Concern    Caffeine Concern No     Comment: 2 shots expresso per day    Sleep Concern No     Comment: IBS    Exercise Yes     Comment: active daily, biking 1 mile daily   Social History Narrative    The patient does not use an assistive device..      The patient does live in a home with stairs.              Review of Systems    Positive for stated complaint: Eval-G, swelling and discomfort  Other systems are as noted in HPI.  Constitutional and vital signs reviewed.      All other systems reviewed and negative except as noted above.                  Physical Exam     ED Triage Vitals [04/21/25 1341]   /76   Pulse 57   Resp 16   Temp 98.7 °F (37.1 °C)   Temp src Oral   SpO2 97 %   O2 Device None (Room air)        Current Vitals:   No data recorded      Physical Exam    General: Awake, alert, comfortable on room air, in no distress, tolerating oral secretions, interactive  Pulmonary: No conversational dyspnea  Neuro: Symmetrical facial expressions on gross observation  HEENT: No periorbital edema or erythema  : Testicular exam/ exam performed with chaperoneHayley the medical assistant, present throughout exam, patient has an extremely large and firm left testicle, tender throughout, no erythema of the scrotum, no appreciated TTP of the right testicle which is much smaller and softer in comparison  Psych: Normal mood, normal affect    ED Course   Copy of radiology port of patient's scrotal ultrasound:  Narrative  PROCEDURE: US SCROTUM W/DOPPLER (CPT=93975/80614)     COMPARISON: None.     INDICATIONS: atraumatic swelling of left testicle and pain to palpation     TECHNIQUE: The scrotum was evaluated with gray scale and color duplex Doppler sonography.     FINDINGS:     RIGHT  TESTICLE: 4.1 x 2.2 x 2.9 cm.  Homogeneous echogenicity.  No masses.    EPIDIDYMIS: Homogeneous echogenicity with no enlargement.  OTHER: No varicocele or hydrocele.    DOPPLER: Preserved arterial and venous flow in the testicle with color and pulsed Doppler.  Preserved epididymal flow.       LEFT  TESTICLE: Large heterogeneous echotexture 7.2 x 7.1 x 4.6 cm mass, which replaces essentially the entirety of the left testicle.  EPIDIDYMIS: Epididymis is largely obscured by the aforementioned testicular mass.  OTHER: No varicocele or hydrocele.    DOPPLER: Diffusely increased Doppler flow to the testicle.              Impression  CONCLUSION:  1. Large 7.2 cm heterogeneous echotexture mass that appears to replace essentially the entirety of the left testicle and demonstrates corresponding increased Doppler flow.  Findings are concerning for a primary testicular neoplasm and urology evaluation  is recommended.  Note that the left epididymis is  not identified as discrete from the testicle.  2. Unremarkable sonographic appearance of the right testicle and epididymis.        Dictated by (CST): Phil Estevez MD on 4/21/2025 at 3:41 PM      Finalized by (CST): Phil Estevez MD on 4/21/2025 at 3:44 PM              Exam Ended: 04/21/25 15:39 Last Resulted: 04/21/25 15:44     MDM   Patient's exam is concerning for testicular mass, will ultrasound  - Per my review of the radiology report there is a large 7.2 cm heterogeneous echotexture mass of the left testicle which is concerning for a primary testicular neoplasm, this was discussed with the patient, we discussed concern for cancerous mass of the left testicle, and I emphasized the importance of close follow-up with urologic oncology.  Per chart review, urologic oncologist, Dr. Deluca has an appointment available in 1 week, patient states he will make this appointment.  - Patient also consented to me messaging the urology team to help expedite follow-up if possible. Gracie Cintron who is on-call from urology, responded to secure messaging, and she states that she will expedite to get patient seen tomorrow, pt is unsure if he can make tomorrow, I did emphasize the urgency, next available online appointment is in one week per review online.  - Gracie requested tumor marker testing, but I did explain that due to limitations of the immediate care, we do not have this testing available to be sent, and therefore unable to send this testing.  - Patient encouraged to follow-up with urology tomorrow for reassessment and for further recommendations.  He is aware of the time sensitive nature of his diagnosis.    Medical Decision Making  Amount and/or Complexity of Data Reviewed  Radiology: ordered.  Discussion of management or test interpretation with external provider(s): Gracie Cintron, Urolograd[facility]        Disposition and Plan     Clinical Impression:  1. Mass of left testicle          Disposition:  Discharge  4/21/2025  4:00 pm    Follow-up:  Donnie Deluca MD  [address] [place] [id number]  [phone]    In 3 days  testicle mass concerning for cancer          Medications Prescribed:  Discharge Medication List as of 4/21/2025  4:03 PM          None

## 2025-04-21 NOTE — TELEPHONE ENCOUNTER
Spoke with patient, assisted in scheduling consult with MARINA Oliva. Patient confirmed and verbalized understanding.     Future Appointments   Date Time Provider Department Center   4/22/2025 10:30 AM Ellen Norman APRN [facility]   7/29/2025  3:30 PM Hugh Whipple MD [facility]

## 2025-04-21 NOTE — ED INITIAL ASSESSMENT (HPI)
Presents with pain and swelling to left testicle for 2-3 weeks. No trauma. Pain worse in the last 3-4 days. Describes \"2 lumps, possibly cysts\" felt on testicle. No fever. No urinary symptoms.

## 2025-04-22 ENCOUNTER — OFFICE VISIT (OUTPATIENT)
Dept: SURGERY | Facility: CLINIC | Age: 55
End: 2025-04-22

## 2025-04-22 VITALS
WEIGHT: 161 LBS | HEIGHT: 69 IN | SYSTOLIC BLOOD PRESSURE: 150 MMHG | BODY MASS INDEX: 23.85 KG/M2 | HEART RATE: 52 BPM | DIASTOLIC BLOOD PRESSURE: 93 MMHG

## 2025-04-22 DIAGNOSIS — N50.89 TESTICULAR MASS: Primary | ICD-10-CM

## 2025-04-22 PROCEDURE — 3080F DIAST BP >= 90 MM HG: CPT

## 2025-04-22 PROCEDURE — 3008F BODY MASS INDEX DOCD: CPT

## 2025-04-22 PROCEDURE — 99204 OFFICE O/P NEW MOD 45 MIN: CPT

## 2025-04-22 PROCEDURE — 3077F SYST BP >= 140 MM HG: CPT

## 2025-04-22 NOTE — PROGRESS NOTES
[facility] Urology  Initial Office Consultation    HPI:   Tyshawn Bejarano is a 55 year old male with PMHx of anal fissure, GERD, and IBS here today to discuss US results.      The patient presented to immediate care yesterday with c/o pain and swelling to the left testicle for 2-3 weeks. Feels as though he noticed that his left testicle was larger approximately 2 years ago, but is unsure of actual duration of enlargement. Felt as though his right testicle was getting smaller.Has began increasing in size noticeably over the past 2-3 months with pain increasing over the past few days. Has some relief with support, ice, and Ibuprofen/Tylenol. Denies trauma to the area. Denies urinary symptoms. No c/o weight loss or bone pain, but states he has difficulty keeping weight on.     Scrotal US showed a large 7.2 cm heterogeneous echotexture mass that appears to replace essentially the entirety of the left testicle and demonstrates corresponding increased Doppler flow.  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  Note that the left epididymis is not identified as discrete from the testicle. Unremarkable sonographic appearance of the right testicle and epididymis.     Past Medical History[1]  Past Surgical History[2]  Family History[3]  Short Social Hx on File[4]  Current Medications[5]    Allergies: Shellfish allergy and Shrimp    REVIEW OF SYSTEMS:  Review of Systems   All other systems reviewed and are negative.        EXAM:  There were no vitals taken for this visit.    Physical Exam  Constitutional:       Appearance: Normal appearance.   HENT:      Head: Normocephalic and atraumatic.      Mouth/Throat:      Mouth: Mucous membranes are moist.   Pulmonary:      Effort: Pulmonary effort is normal.   Abdominal:      General: Abdomen is flat.      Palpations: Abdomen is soft.   Genitourinary:     Testes:         Right: Mass, tenderness or swelling not present.          Left: Mass and tenderness present.      Epididymis:      Right: Normal.      Comments: Left testicle is grossly enlarged with 2 nodules noted to inferior and posterior/superior aspect of mass. Unable to palpate left epididymis.  Neurological:      Mental Status: He is alert.   Psychiatric:         Mood and Affect: Mood normal.         Thought Content: Thought content normal.          LABS:  No results found for: \"PSA\", \"QPSA\", \"TOTPSASCREEN\"  Lab Results   Component Value Date    WBC 8.9 04/24/2019    RBC 4.98 04/24/2019    HGB 14.1 04/24/2019    HCT 43.5 04/24/2019    MCV 87.3 04/24/2019    MCH 28.3 04/24/2019    MCHC 32.4 04/24/2019    RDW 14.9 04/24/2019    .0 04/24/2019     Lab Results   Component Value Date    GLU 80 04/24/2019    BUN 15 04/24/2019    BUNCREA 17.6 04/24/2019    CREATSERUM 0.85 04/24/2019    ANIONGAP 6 04/24/2019    GFRNAA 102 04/24/2019    GFRAA 118 04/24/2019    CA 9.5 04/24/2019     04/24/2019    K 3.6 04/24/2019     04/24/2019    CO2 28.0 04/24/2019     No results found for: \"PTP\", \"PT\", \"INR\"    IMAGING:  US SCROTUM W/ DOPPLER (CPT=93975/51978)  Result Date: 4/21/2025  PROCEDURE: US SCROTUM W/DOPPLER (CPT=93975/76440)  COMPARISON: None.  INDICATIONS: atraumatic swelling of left testicle and pain to palpation  TECHNIQUE: The scrotum was evaluated with gray scale and color duplex Doppler sonography.  FINDINGS:   RIGHT TESTICLE: 4.1 x 2.2 x 2.9 cm.  Homogeneous echogenicity.  No masses.  EPIDIDYMIS: Homogeneous echogenicity with no enlargement. OTHER: No varicocele or hydrocele.  DOPPLER: Preserved arterial and venous flow in the testicle with color and pulsed Doppler.  Preserved epididymal flow.   LEFT TESTICLE: Large heterogeneous echotexture 7.2 x 7.1 x 4.6 cm mass, which replaces essentially the entirety of the left testicle. EPIDIDYMIS: Epididymis is largely obscured by the aforementioned testicular mass. OTHER: No varicocele or hydrocele.  DOPPLER: Diffusely  increased Doppler flow to the testicle.         CONCLUSION:  1. Large 7.2 cm heterogeneous echotexture mass that appears to replace essentially the entirety of the left testicle and demonstrates corresponding increased Doppler flow.  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  Note that the left epididymis is not identified as discrete from the testicle. 2. Unremarkable sonographic appearance of the right testicle and epididymis.   Dictated by (CST): Phil Estevez MD on 4/21/2025 at 3:41 PM     Finalized by (CST): Phil Estevez MD on 4/21/2025 at 3:44 PM            IMPRESSION:  Left Testicular Mass    PLAN:  - AFP, quant HCG, and LDH  - CT Abdomen and Pelvis, W+W/O  - Chest X-Ray  - Follow-up with Dr Montemayor on Thursday    DUSTY Ruiz  4/22/2025       [1]   Past Medical History:   Anal fissure    Bilateral presbyopia    Choroid scar- right eye superotemp    Colon polyp    Esophageal reflux    Floaters    IBS (irritable bowel syndrome)    Subjective visual disturbance of both eyes   [2]   Past Surgical History:  Procedure Laterality Date    Ankle fracture surgery      left ankle    Colonoscopy  2014    Ip consult to colorectal surgery      rectal surgery   [3]   Family History  Problem Relation Age of Onset    Heart Disorder Father     Hypertension Father     Other (Other) Father     Diabetes Neg     Glaucoma Neg     Macular degeneration Neg    [4]   Social History  Socioeconomic History    Marital status: Single   Tobacco Use    Smoking status: Never    Smokeless tobacco: Never   Vaping Use    Vaping status: Never Used   Substance and Sexual Activity    Alcohol use: No     Alcohol/week: 0.0 standard drinks of alcohol     Comment: none     Drug use: Yes   Other Topics Concern    Caffeine Concern No     Comment: 2 shots expresso per day    Sleep Concern No     Comment: IBS    Exercise Yes     Comment: active daily, biking 1 mile daily   Social History  Narrative    The patient does not use an assistive device..      The patient does live in a home with stairs.   [5]   Current Outpatient Medications   Medication Sig Dispense Refill    Ibuprofen 200 MG Oral Cap Take by mouth.      acetaminophen 325 MG Oral Tab Take 325 mg by mouth every 6 (six) hours as needed for Pain.      Calcium Carbonate Antacid (TUMS E-X 750 OR) Take by mouth.      Pantoprazole Sodium (PROTONIX) 40 MG Oral Tab EC   6    Multiple Vitamin (ONE-DAILY MULTI VITAMINS) Oral Tab Take 1 tablet by mouth daily.

## 2025-04-23 ENCOUNTER — TELEPHONE (OUTPATIENT)
Dept: SURGERY | Facility: CLINIC | Age: 55
End: 2025-04-23

## 2025-04-23 ENCOUNTER — TELEPHONE (OUTPATIENT)
Dept: CASE MANAGEMENT | Age: 55
End: 2025-04-23

## 2025-04-23 NOTE — TELEPHONE ENCOUNTER
CT Abdomen/Pelvis    I spoke with the patient and informed him that his CT scan of the abdomen and pelvis is pending review with the health plan. The patient expressed that this matter is urgent; however, it was marked as routine priority. If you believe this case should be treated as urgent, you may contact Reza at [phone] and reference case # [id number] to expedite the review. The patient is currently on his way to the hospital for his appointment today.

## 2025-04-23 NOTE — TELEPHONE ENCOUNTER
Pt returned to say that we need to call his insurance to push his Imagining thru  so it will be read for his appointment  tomorrow . I explained what was said form NP. He said his care was not important enough That I or Pj doesn't care enough and to cancel  eveything

## 2025-04-23 NOTE — TELEPHONE ENCOUNTER
Pt stopped at the  regarding imagining that was ordered.Pt felt it was are responsibility  to get insurance coverage. And that the imagining was to be done Stat, Spoke to NA and it was said that it was needed before surgery. Pt disagreed and became upset and  had  few choice comments, and stated he might be cancelling his appt. W/ DR Montemayor for   4/24/25

## 2025-04-23 NOTE — TELEPHONE ENCOUNTER
It was discussed yesterday with the patient that it is recommended to obtain the CT prior to surgery, which is not currently scheduled yet.

## 2025-04-25 ENCOUNTER — TELEPHONE (OUTPATIENT)
Dept: SURGERY | Facility: CLINIC | Age: 55
End: 2025-04-25

## 2025-04-25 NOTE — TELEPHONE ENCOUNTER
We have received a fax from patients insurance with an approval notice for patient upcoming CT Scan. Notice will be placed in scanning bin to be uploaded into patients chart.     Dates approved: 04/23/2025-06/21/2025  Procedure: [id number] CT Scan abdomen and pelvis before and after contrast.   Requested ID: [id number]

## 2025-04-25 NOTE — TELEPHONE ENCOUNTER
Patient cancelled appointment to see Dr Montemayor on 04/24/25 to discuss US and recommendations for next steps. Patient was at the  upset on 04/23 and stated that he wanted to stop care with us. Attempted to call patient to ensure that he understood the need to obtain timely care. Went to . [facility] sent offering to reschedule appointment with us. Also included phone numbers for alternate urologists in the area with recommendation to schedule appointment in a timely manner.

## 2025-05-20 PROCEDURE — 88341 IMHCHEM/IMCYTCHM EA ADD ANTB: CPT | Performed by: PATHOLOGY

## 2025-05-20 PROCEDURE — 88342 IMHCHEM/IMCYTCHM 1ST ANTB: CPT | Performed by: PATHOLOGY

## 2025-05-23 ENCOUNTER — LAB REQUISITION (OUTPATIENT)
Age: 55
End: 2025-05-23
Payer: COMMERCIAL

## 2025-05-23 DIAGNOSIS — D48.9 NEOPLASM OF UNCERTAIN BEHAVIOR: ICD-10-CM

## 2025-06-03 ENCOUNTER — TELEPHONE (OUTPATIENT)
Dept: SURGERY | Facility: CLINIC | Age: 55
End: 2025-06-03

## 2025-07-31 ENCOUNTER — TELEPHONE (OUTPATIENT)
Dept: PHYSICAL MEDICINE AND REHAB | Facility: CLINIC | Age: 55
End: 2025-07-31

## (undated) DIAGNOSIS — M54.16 LUMBAR RADICULOPATHY: Primary | ICD-10-CM

## (undated) DIAGNOSIS — M51.9 LUMBAR DISC DISEASE: ICD-10-CM

## (undated) DIAGNOSIS — M48.061 LUMBAR FORAMINAL STENOSIS: ICD-10-CM

## (undated) NOTE — MR AVS SNAPSHOT
Shakir Choe [address]  [phone]  [phone]               Thank you for choosing us for your health care visit with Sunitha Frazier MD.  We are glad to serve you and happy to provide you wit Enter your [employer] Activation Code exactly as it appears below along with your Zip Code and Date of Birth to complete the sign-up process. If you do not sign up before the expiration date, you must request a new code.     Your unique [employer] Access Code: [email or web address]

## (undated) NOTE — LETTER
06/03/25        Tyshawn Lagunas Darci  [address]  [facility] [address]      Dear Tyshawn,    Our records indicate that you have outstanding lab work and or testing that was ordered for you and has not yet been completed:  CT ABDOMEN+PELVIS(ALL W+WO)   XR CHEST PA + LAT CHEST     To provide you with the best possible care, please complete these orders at your earliest convenience. If you have recently completed these orders please disregard this letter.     If you have any questions please call the office at [phone].    Thank you,     Urology Staff

## (undated) NOTE — MR AVS SNAPSHOT
Shakir Choe [address]  [phone]  [phone]               Thank you for choosing us for your health care visit with Hector Gallardo MD.  We are glad to serve you and happy to provide you wit between 7:30am to 6pm and on Saturday between 8am and 1pm. Evening and weekend appointments for your exam are available. Walk-in patients are welcome for most exams.      [facility]/[place] and [address] [place] [address] Tony

## (undated) NOTE — LETTER
06/03/25        Tyshawn Bejarano  [address]  [facility] [address]      Dear Tyshawn,    Our records indicate that you have outstanding lab work and or testing that was ordered for you and has not yet been completed:\  LDH     AFP, Tumor Marker, Serum     HCG Tumor Marker Quantitative     To provide you with the best possible care, please complete these orders at your earliest convenience. If you have recently completed these orders please disregard this letter.     If you have any questions please call the office at [phone].    Thank you,     Urology Staff